# Patient Record
Sex: FEMALE | Race: OTHER | Employment: OTHER | URBAN - METROPOLITAN AREA
[De-identification: names, ages, dates, MRNs, and addresses within clinical notes are randomized per-mention and may not be internally consistent; named-entity substitution may affect disease eponyms.]

---

## 2017-11-06 NOTE — PATIENT DISCUSSION
Based on today’s exam, diagnostic studies, and/or review of records, the determination was made for treatment in 3 weeks.

## 2017-11-27 NOTE — PROCEDURE NOTE: CLINICAL
PROCEDURE NOTE: Eylea(1 of 2) #5 OS. Diagnosis: Moderate Nonproliferative Diabetic Retinopathy. Prep: Betadine Drops and Betadine Scrub. Prior to injection, risks/benefits/alternatives discussed including corneal abrasion, infection, loss of vision, hemorrhage, cataract, glaucoma, retinal tears or detachment. A written consent is on file, and the need for today’s injection was discussed and the patient is understanding and wishes to proceed. The entire vial of Eylea was drawn up into a syringe. The opened vial, remaining drug, and filtered needle were disposed of in a certified biohazard container. Betadine prep was performed. Topical anesthesia was induced with Alcaine. 4% lidocaine pledge. A lid speculum was used. A short 30g needle on a 1cc syringe was used with product that that had previously been prepared under sterile conditions. Injection site: 3-4 mm from the limbus. The used syringe/needle was transferred to a biohazard container. Lid speculum removed. Mask worn during procedure. Patient tolerated procedure well. Count fingers vision was verified. There were no complications. Patient was given the standard instruction sheet. Patient given office phone number/answering service number and advised to call immediately should there be loss of vision or pain, or should they have any other questions or concerns. Aubrey Gomez

## 2018-03-19 NOTE — PROCEDURE NOTE: CLINICAL
PROCEDURE NOTE: Eylea(1 of 2) #7 OS. Diagnosis: Moderate Nonproliferative Diabetic Retinopathy. Prep: Betadine Drops and Betadine Scrub. Prior to injection, risks/benefits/alternatives discussed including corneal abrasion, infection, loss of vision, hemorrhage, cataract, glaucoma, retinal tears or detachment. A written consent is on file, and the need for today’s injection was discussed and the patient is understanding and wishes to proceed. The entire vial of Eylea was drawn up into a syringe. The opened vial, remaining drug, and filtered needle were disposed of in a certified biohazard container. Betadine prep was performed. Topical anesthesia was induced with Alcaine. 4% lidocaine pledge. A lid speculum was used. A short 30g needle on a 1cc syringe was used with product that that had previously been prepared under sterile conditions. Injection site: 3-4 mm from the limbus. The used syringe/needle was transferred to a biohazard container. Lid speculum removed. Mask worn during procedure. Patient tolerated procedure well. Count fingers vision was verified. There were no complications. Patient was given the standard instruction sheet. Patient given office phone number/answering service number and advised to call immediately should there be loss of vision or pain, or should they have any other questions or concerns. Mariam Diaz

## 2018-03-19 NOTE — PATIENT DISCUSSION
Pt states he was unable to get authorized to have tx until 2/8/18 and had to go back to the [place] clinic to have it done.    Still some swelling present.  Rec series of 2 Eylea x 1 month apart.  Eylea #7(1 of 2) today.  Pt to follow up in [place] with retina specialist while up [facility].

## 2018-04-16 NOTE — PROCEDURE NOTE: CLINICAL
PROCEDURE NOTE: Eylea (2 of 2) #8 OS. Diagnosis: Moderate Nonproliferative Diabetic Retinopathy. Prior to injection, risks/benefits/alternatives discussed including corneal abrasion, infection, loss of vision, hemorrhage, cataract, glaucoma, retinal tears or detachment. A written consent is on file, and the need for today’s injection was discussed and the patient is understanding and wishes to proceed. The entire vial of Eylea was drawn up into a syringe. The opened vial, remaining drug, and filtered needle were disposed of in a certified biohazard container. Betadine prep was performed. Topical anesthesia was induced with Alcaine. 4% lidocaine pledge. A lid speculum was used. A short 30g needle on a 1cc syringe was used with product that that had previously been prepared under sterile conditions. Injection site: 3-4 mm from the limbus. The used syringe/needle was transferred to a biohazard container. Lid speculum removed. Mask worn during procedure. Patient tolerated procedure well. Count fingers vision was verified. There were no complications. Patient was given the standard instruction sheet. Patient given office phone number/answering service number and advised to call immediately should there be loss of vision or pain, or should they have any other questions or concerns. Juan Zarco

## 2018-04-16 NOTE — PATIENT DISCUSSION
Bridgett #8(2 of 2) today.  Pt to follow up in [place] with retina specialist while up [facility].

## 2018-10-22 NOTE — PROCEDURE NOTE: CLINICAL
PROCEDURE NOTE: Eylea 2mg #12 OS. Diagnosis: Moderate Nonproliferative Diabetic Retinopathy. Prior to injection, risks/benefits/alternatives discussed including corneal abrasion, infection, loss of vision, hemorrhage, cataract, glaucoma, retinal tears or detachment. A written consent is on file, and the need for today’s injection was discussed and the patient is understanding and wishes to proceed. The entire vial of Eylea was drawn up into a syringe. The opened vial, remaining drug, and filtered needle were disposed of in a certified biohazard container. Betadine prep was performed. Topical anesthesia was induced with Alcaine. 4% lidocaine pledge. A lid speculum was used. A short 30g needle on a 1cc syringe was used with product that that had previously been prepared under sterile conditions. Injection site: 3-4 mm from the limbus. The used syringe/needle was transferred to a biohazard container. Lid speculum removed. Mask worn during procedure. Patient tolerated procedure well. Count fingers vision was verified. There were no complications. Patient was given the standard instruction sheet. Patient given office phone number/answering service number and advised to call immediately should there be loss of vision or pain, or should they have any other questions or concerns. Cristel Jones

## 2018-10-22 NOTE — PATIENT DISCUSSION
Edema seen on OCT or exam today.  Recommended Eylea #12 injection today.  The injection was administered without complication.  Post-injection instructions were reviewed and understood by pt.

## 2018-10-22 NOTE — PATIENT DISCUSSION
----- Message from Kalina Fernandez sent at 2/27/2017  8:32 AM CST -----  Contact: Pt Spouse/Sindhu  Caller request call from nurse regardng pt having side effects to Tramadol and wants to discuss, please contact caller at [phone]   see #1.

## 2018-12-03 NOTE — PROCEDURE NOTE: CLINICAL
PROCEDURE NOTE: Eylea 2mg #13 OS. Diagnosis: Moderate Nonproliferative Diabetic Retinopathy. Prior to injection, risks/benefits/alternatives discussed including corneal abrasion, infection, loss of vision, hemorrhage, cataract, glaucoma, retinal tears or detachment. A written consent is on file, and the need for today’s injection was discussed and the patient is understanding and wishes to proceed. The entire vial of Eylea was drawn up into a syringe. The opened vial, remaining drug, and filtered needle were disposed of in a certified biohazard container. Betadine prep was performed. Topical anesthesia was induced with Alcaine. 4% lidocaine pledge. A lid speculum was used. A short 30g needle on a 1cc syringe was used with product that that had previously been prepared under sterile conditions. Injection site: 3-4 mm from the limbus. The used syringe/needle was transferred to a biohazard container. Lid speculum removed. Mask worn during procedure. Patient tolerated procedure well. Count fingers vision was verified. There were no complications. Patient was given the standard instruction sheet. Patient given office phone number/answering service number and advised to call immediately should there be loss of vision or pain, or should they have any other questions or concerns. Robert Upton

## 2018-12-03 NOTE — PATIENT DISCUSSION
Edema seen on OCT or exam today.  Recommended Eylea #13 injection today.  The injection was administered without complication.  Post-injection instructions were reviewed and understood by pt.

## 2019-01-21 NOTE — PATIENT DISCUSSION
Edema seen on OCT or exam today.  Recommended Eylea #14 injection today.  The injection was administered without complication.  Post-injection instructions were reviewed and understood by pt.

## 2019-01-21 NOTE — PROCEDURE NOTE: CLINICAL
PROCEDURE NOTE: Eylea 2mg #14 OS. Diagnosis: Moderate Nonproliferative Diabetic Retinopathy. Prior to injection, risks/benefits/alternatives discussed including corneal abrasion, infection, loss of vision, hemorrhage, cataract, glaucoma, retinal tears or detachment. A written consent is on file, and the need for today’s injection was discussed and the patient is understanding and wishes to proceed. The entire vial of Eylea was drawn up into a syringe. The opened vial, remaining drug, and filtered needle were disposed of in a certified biohazard container. Betadine prep was performed. Topical anesthesia was induced with Alcaine. 4% lidocaine pledge. A lid speculum was used. A short 30g needle on a 1cc syringe was used with product that that had previously been prepared under sterile conditions. Injection site: 3-4 mm from the limbus. The used syringe/needle was transferred to a biohazard container. Lid speculum removed. Mask worn during procedure. Patient tolerated procedure well. Count fingers vision was verified. There were no complications. Patient was given the standard instruction sheet. Patient given office phone number/answering service number and advised to call immediately should there be loss of vision or pain, or should they have any other questions or concerns. Lot # [id number], exp 7/31/19.

## 2019-03-18 NOTE — PROCEDURE NOTE: CLINICAL
PROCEDURE NOTE: Eylea 2mg (1 of 2) #15 OS. Diagnosis: Moderate Nonproliferative Diabetic Retinopathy. Prior to injection, risks/benefits/alternatives discussed including corneal abrasion, infection, loss of vision, hemorrhage, cataract, glaucoma, retinal tears or detachment. A written consent is on file, and the need for today’s injection was discussed and the patient is understanding and wishes to proceed. The entire vial of Eylea was drawn up into a syringe. The opened vial, remaining drug, and filtered needle were disposed of in a certified biohazard container. Betadine prep was performed. Topical anesthesia was induced with Alcaine. 4% lidocaine pledge. A lid speculum was used. A short 30g needle on a 1cc syringe was used with product that that had previously been prepared under sterile conditions. Injection site: 3-4 mm from the limbus. The used syringe/needle was transferred to a biohazard container. Lid speculum removed. Mask worn during procedure. Patient tolerated procedure well. Count fingers vision was verified. There were no complications. Patient was given the standard instruction sheet. Patient given office phone number/answering service number and advised to call immediately should there be loss of vision or pain, or should they have any other questions or concerns. Dayne Hoskins

## 2019-03-18 NOTE — PATIENT DISCUSSION
Pt edu edema seen on OCT and exam today.  Recommended Eylea #15 (1 of 2) injection today.  The injection was administered without complication.  Post-injection instructions were reviewed and understood by pt. Will make this a series since patient is leaving to go back [facility] [address].

## 2019-04-29 NOTE — PATIENT DISCUSSION
Eylea #16 (2 of 2) injection today.  The injection was administered without complication.  Post-injection instructions were reviewed and understood by pt. Recommend pt be seen up [facility] in 6 weeks.

## 2019-04-29 NOTE — PROCEDURE NOTE: CLINICAL
PROCEDURE NOTE: Eylea 2mg ( 2 of 2) #16 OS. Diagnosis: Diabetic Macular Edema. Prior to injection, risks/benefits/alternatives discussed including corneal abrasion, infection, loss of vision, hemorrhage, cataract, glaucoma, retinal tears or detachment. A written consent is on file, and the need for today’s injection was discussed and the patient is understanding and wishes to proceed. The entire vial of Eylea was drawn up into a syringe. The opened vial, remaining drug, and filtered needle were disposed of in a certified biohazard container. Betadine prep was performed. Topical anesthesia was induced with Alcaine. 4% lidocaine pledge. A lid speculum was used. A short 30g needle on a 1cc syringe was used with product that that had previously been prepared under sterile conditions. Injection site: 3-4 mm from the limbus. The used syringe/needle was transferred to a biohazard container. Lid speculum removed. Mask worn during procedure. Patient tolerated procedure well. Count fingers vision was verified. There were no complications. Patient was given the standard instruction sheet. Patient given office phone number/answering service number and advised to call immediately should there be loss of vision or pain, or should they have any other questions or concerns. Katlyn Walsh

## 2019-10-14 NOTE — PATIENT DISCUSSION
Good response to tx, high tendency for activity to return.  Rec series of 2 Eylea x 5-6 weeks apart.  ***PT was treated up [facility] less than 28 days ago, too soon for tx today.  Pt to return for tx as scheduled below. ***.

## 2019-10-16 NOTE — PROCEDURE NOTE: CLINICAL
PROCEDURE NOTE: Eylea 2mg (1 of 2) #18 OS. Diagnosis: Moderate Nonproliferative Diabetic Retinopathy. Prep: Betadine Drops and Betadine Scrub. Prior to injection, risks/benefits/alternatives discussed including corneal abrasion, infection, loss of vision, hemorrhage, cataract, glaucoma, retinal tears or detachment. A written consent is on file, and the need for today’s injection was discussed and the patient is understanding and wishes to proceed. The entire vial of Eylea was drawn up into a syringe. The opened vial, remaining drug, and filtered needle were disposed of in a certified biohazard container. Betadine prep was performed. Topical anesthesia was induced with Alcaine. 4% lidocaine pledge. A lid speculum was used. A short 30g needle on a 1cc syringe was used with product that that had previously been prepared under sterile conditions. Injection site: 3-4 mm from the limbus. The used syringe/needle was transferred to a biohazard container. Lid speculum removed. Mask worn during procedure. Patient tolerated procedure well. Count fingers vision was verified. There were no complications. Patient was given the standard instruction sheet. Patient given office phone number/answering service number and advised to call immediately should there be loss of vision or pain, or should they have any other questions or concerns. Mariam Diaz

## 2019-12-30 NOTE — PROCEDURE NOTE: CLINICAL
PROCEDURE NOTE: Eylea 2mg (2 of 2) #19 OS. Diagnosis: Diabetic Macular Edema. Prep: Betadine Drops and Betadine Scrub. Prior to injection, risks/benefits/alternatives discussed including corneal abrasion, infection, loss of vision, hemorrhage, cataract, glaucoma, retinal tears or detachment. A written consent is on file, and the need for today’s injection was discussed and the patient is understanding and wishes to proceed. The entire vial of Eylea was drawn up into a syringe. The opened vial, remaining drug, and filtered needle were disposed of in a certified biohazard container. Betadine prep was performed. Topical anesthesia was induced with Alcaine. 4% lidocaine pledge. A lid speculum was used. A short 30g needle on a 1cc syringe was used with product that that had previously been prepared under sterile conditions. Injection site: 3-4 mm from the limbus. The used syringe/needle was transferred to a biohazard container. Lid speculum removed. Mask worn during procedure. Patient tolerated procedure well. Count fingers vision was verified. There were no complications. Patient was given the standard instruction sheet. Patient given office phone number/answering service number and advised to call immediately should there be loss of vision or pain, or should they have any other questions or concerns. Justine Nichols

## 2019-12-30 NOTE — PATIENT DISCUSSION
Pt here for Eylea #19(2 of 2) today. OCT performed today due to decreased vision. Worse DME. Will follow up in 4-5 weeks.

## 2020-03-02 NOTE — PATIENT DISCUSSION
Bridgett #20 (1 of 2) Given without complication. Post injection instructions given and understood by patient.

## 2020-03-30 NOTE — PATIENT DISCUSSION
Bridgett #21 (2 of 2) Given without complication. Post injection instructions given and understood by patient.

## 2020-03-30 NOTE — PROCEDURE NOTE: CLINICAL
PROCEDURE NOTE: Eylea Prefilled Syringe 2mg (2 of 2) #21 OS. Diagnosis: Diabetes, Type II with Ocular Complications. Prior to injection, risks/benefits/alternatives discussed including corneal abrasion, infection, loss of vision, hemorrhage, cataract, glaucoma, retinal tears or detachment. A written consent is on file, and the need for today's injection was discussed and the patient is understanding and wishes to proceed. A 30G needle was placed on an Eylea 2mg/0.05ml Pre-filled Syringe. Betadine prep was performed. Topical anesthesia was induced with Alcaine. 4% lidocaine pledge. A lid speculum was used. An intravitreal injection of Eylea was given. Injection site: 3-4 mm from the limbus. The used syringe/needle was transferred to a biohazard container. Lid speculum removed. Mask worn during procedure. Patient tolerated procedure well. Count fingers vision was verified. There were no complications. Patient was given the standard instruction sheet. Cristel Jones

## 2020-10-12 NOTE — PATIENT DISCUSSION
Edema is resolved but has been persistent. Based on today’s exam, diagnostic studies, and/or review of records, the determination was made for treatment.  Too soon for another Eylea(pt just had Eylea 9/30/2020 in [place]), due to pt's conflicting travel schedule RTC 10/26/2020 and will use SAMPLE Eylea since will still be sooner than 28 days.  Series of 2 Eylea to be scheduled x 4 weeks apart.

## 2020-10-26 NOTE — PROCEDURE NOTE: CLINICAL
PROCEDURE NOTE: Eylea Sample (1 of 2) #26 OS. Diagnosis: Moderate Nonproliferative Diabetic Retinopathy. Anesthesia: Topical. Prep: Betadine Drops and Betadine Scrub. Prior to injection, risks/benefits/alternatives discussed including corneal abrasion, infection, loss of vision, hemorrhage, cataract, glaucoma, retinal tears or detachment. A written consent is on file, and the need for today’s injection was discussed and the patient is understanding and wishes to proceed. The entire vial of Eylea was drawn up into a syringe. The opened vial, remaining drug, and filtered needle were disposed of in a certified biohazard container. Betadine prep was performed. Topical anesthesia was induced with Alcaine. 4% lidocaine pledge. A lid speculum was used. A short 30g needle on a 1cc syringe was used with product that that had previously been prepared under sterile conditions. Injection site: 3-4 mm from the limbus. The used syringe/needle was transferred to a biohazard container. Lid speculum removed. Mask worn during procedure. Patient tolerated procedure well. Count fingers vision was verified. There were no complications. Patient was given the standard instruction sheet. Patient given office phone number/answering service number and advised to call immediately should there be loss of vision or pain, or should they have any other questions or concerns. Mariam Diaz

## 2020-10-26 NOTE — PATIENT DISCUSSION
Pt here for Bridgett Sample #26 (1 of 2). Injection given and tolerated well. *Samples used today due to <28 days from last injection. * Post injection instructions given and understood by patient. Will follow up in 4 weeks for Bridgett (2 of 2).

## 2020-11-30 NOTE — PROCEDURE NOTE: CLINICAL
PROCEDURE NOTE: Eylea 2mg (2 of 2) #27 OS. Diagnosis: Moderate Nonproliferative Diabetic Retinopathy. Prep: Betadine Drops and Betadine Scrub. Prior to injection, risks/benefits/alternatives discussed including corneal abrasion, infection, loss of vision, hemorrhage, cataract, glaucoma, retinal tears or detachment. A written consent is on file, and the need for today’s injection was discussed and the patient is understanding and wishes to proceed. The entire vial of Eylea was drawn up into a syringe. The opened vial, remaining drug, and filtered needle were disposed of in a certified biohazard container. Betadine prep was performed. Topical anesthesia was induced with Alcaine. 4% lidocaine pledge. A lid speculum was used. A short 30g needle on a 1cc syringe was used with product that that had previously been prepared under sterile conditions. Injection site: 3-4 mm from the limbus. The used syringe/needle was transferred to a biohazard container. Lid speculum removed. Mask worn during procedure. Patient tolerated procedure well. Count fingers vision was verified. There were no complications. Patient was given the standard instruction sheet. Patient given office phone number/answering service number and advised to call immediately should there be loss of vision or pain, or should they have any other questions or concerns. Gypsy Meng

## 2021-02-15 NOTE — PROCEDURE NOTE: CLINICAL
PROCEDURE NOTE: Eylea 2mg (1 of 2) #28 OS. Diagnosis: Moderate Nonproliferative Diabetic Retinopathy. Prep: Betadine Drops and Betadine Scrub. Prior to injection, risks/benefits/alternatives discussed including corneal abrasion, infection, loss of vision, hemorrhage, cataract, glaucoma, retinal tears or detachment. A written consent is on file, and the need for today’s injection was discussed and the patient is understanding and wishes to proceed. The entire vial of Eylea was drawn up into a syringe. The opened vial, remaining drug, and filtered needle were disposed of in a certified biohazard container. Betadine prep was performed. Topical anesthesia was induced with Alcaine. 4% lidocaine pledge. A lid speculum was used. A short 30g needle on a 1cc syringe was used with product that that had previously been prepared under sterile conditions. Injection site: 3-4 mm from the limbus. The used syringe/needle was transferred to a biohazard container. Lid speculum removed. Mask worn during procedure. Patient tolerated procedure well. Count fingers vision was verified. There were no complications. Patient was given the standard instruction sheet. Patient given office phone number/answering service number and advised to call immediately should there be loss of vision or pain, or should they have any other questions or concerns. Lexie Ackerman

## 2021-02-15 NOTE — PATIENT DISCUSSION
Bridgett #28 (1 of 2) given with out complication, post injection instructions given and understood by patient.

## 2021-03-29 NOTE — PATIENT DISCUSSION
Bridgett #29 (2 of 2) given with out complication, post injection instructions given and understood by patient.

## 2021-03-29 NOTE — PROCEDURE NOTE: CLINICAL
PROCEDURE NOTE: Eylea 2mg (2 of 2) #29 OS. Diagnosis: Moderate Nonproliferative Diabetic Retinopathy. Prep: Betadine Drops and Betadine Scrub. Prior to injection, risks/benefits/alternatives discussed including corneal abrasion, infection, loss of vision, hemorrhage, cataract, glaucoma, retinal tears or detachment. A written consent is on file, and the need for today’s injection was discussed and the patient is understanding and wishes to proceed. The entire vial of Eylea was drawn up into a syringe. The opened vial, remaining drug, and filtered needle were disposed of in a certified biohazard container. Betadine prep was performed. Topical anesthesia was induced with Alcaine. 4% lidocaine pledge. A lid speculum was used. A short 30g needle on a 1cc syringe was used with product that that had previously been prepared under sterile conditions. Injection site: 3-4 mm from the limbus. The used syringe/needle was transferred to a biohazard container. Lid speculum removed. Mask worn during procedure. Patient tolerated procedure well. Count fingers vision was verified. There were no complications. Patient was given the standard instruction sheet. Patient given office phone number/answering service number and advised to call immediately should there be loss of vision or pain, or should they have any other questions or concerns. Suresh Barrett

## 2021-11-01 NOTE — PROCEDURE NOTE: CLINICAL
PROCEDURE NOTE: Eylea 2mg(1 of 2) #32 OS. Diagnosis: Moderate Nonproliferative Diabetic Retinopathy. Prep: Betadine Drops and Betadine Scrub. Prior to injection, risks/benefits/alternatives discussed including corneal abrasion, infection, loss of vision, hemorrhage, cataract, glaucoma, retinal tears or detachment. A written consent is on file, and the need for today’s injection was discussed and the patient is understanding and wishes to proceed. The entire vial of Eylea was drawn up into a syringe. The opened vial, remaining drug, and filtered needle were disposed of in a certified biohazard container. Betadine prep was performed. Topical anesthesia was induced with Alcaine. 4% lidocaine pledge. A lid speculum was used. A short 30g needle on a 1cc syringe was used with product that that had previously been prepared under sterile conditions. Injection site: 3-4 mm from the limbus. The used syringe/needle was transferred to a biohazard container. Lid speculum removed. Mask worn during procedure. Patient tolerated procedure well. Count fingers vision was verified. There were no complications. Patient was given the standard instruction sheet. Patient given office phone number/answering service number and advised to call immediately should there be loss of vision or pain, or should they have any other questions or concerns. Florentin Fernández

## 2021-11-01 NOTE — PATIENT DISCUSSION
Good response to tx, rec Eylea #32(2 of 2) given with out complication, post injection instructions given and understood by patient.  series of 2 x 5-6 weeks apart.

## 2021-12-06 NOTE — PROCEDURE NOTE: CLINICAL
PROCEDURE NOTE: Eylea 2mg (2 of 2) #33 OS. Diagnosis: Moderate Nonproliferative Diabetic Retinopathy. Prep: Betadine Drops and Betadine Scrub. Prior to injection, risks/benefits/alternatives discussed including corneal abrasion, infection, loss of vision, hemorrhage, cataract, glaucoma, retinal tears or detachment. A written consent is on file, and the need for today’s injection was discussed and the patient is understanding and wishes to proceed. The entire vial of Eylea was drawn up into a syringe. The opened vial, remaining drug, and filtered needle were disposed of in a certified biohazard container. Betadine prep was performed. Topical anesthesia was induced with Alcaine. 4% lidocaine pledge. A lid speculum was used. A short 30g needle on a 1cc syringe was used with product that that had previously been prepared under sterile conditions. Injection site: 3-4 mm from the limbus. The used syringe/needle was transferred to a biohazard container. Lid speculum removed. Mask worn during procedure. Patient tolerated procedure well. Count fingers vision was verified. There were no complications. Patient was given the standard instruction sheet. Patient given office phone number/answering service number and advised to call immediately should there be loss of vision or pain, or should they have any other questions or concerns. Wayne Espinosa

## 2021-12-06 NOTE — PATIENT DISCUSSION
Patient here for Eylea #33 (2 of 2) injection today. The injection was given with out complication, post injection instructions given and understood by patient. Today ends series of 2.  Return  5-6 weeks for OCT.

## 2022-01-17 NOTE — PATIENT DISCUSSION
Rec Eylea #34 (1 of 2) injection today. The injection was given with out complication, post injection instructions given and understood by patient.  Series of 2 x 5-6 weeks apart.

## 2022-01-17 NOTE — PROCEDURE NOTE: CLINICAL
PROCEDURE NOTE: Eylea 2mg(1 of 2) #34 OS. Diagnosis: Moderate Nonproliferative Diabetic Retinopathy. Prep: Betadine Drops and Betadine Scrub. Prior to injection, risks/benefits/alternatives discussed including corneal abrasion, infection, loss of vision, hemorrhage, cataract, glaucoma, retinal tears or detachment. A written consent is on file, and the need for today’s injection was discussed and the patient is understanding and wishes to proceed. The entire vial of Eylea was drawn up into a syringe. The opened vial, remaining drug, and filtered needle were disposed of in a certified biohazard container. Betadine prep was performed. Topical anesthesia was induced with Alcaine. 4% lidocaine pledge. A lid speculum was used. A short 30g needle on a 1cc syringe was used with product that that had previously been prepared under sterile conditions. Injection site: 3-4 mm from the limbus. The used syringe/needle was transferred to a biohazard container. Lid speculum removed. Mask worn during procedure. Patient tolerated procedure well. Count fingers vision was verified. There were no complications. Patient was given the standard instruction sheet. Patient given office phone number/answering service number and advised to call immediately should there be loss of vision or pain, or should they have any other questions or concerns. [employer]

## 2022-02-11 ENCOUNTER — NEW PATIENT (OUTPATIENT)
Dept: URBAN - METROPOLITAN AREA CLINIC 46 | Facility: CLINIC | Age: 52
End: 2022-02-11

## 2022-02-11 DIAGNOSIS — Z90.01: ICD-10-CM

## 2022-02-11 DIAGNOSIS — G45.3: ICD-10-CM

## 2022-02-11 DIAGNOSIS — E10.3591: ICD-10-CM

## 2022-02-11 DIAGNOSIS — H57.89: ICD-10-CM

## 2022-02-11 DIAGNOSIS — H53.8: ICD-10-CM

## 2022-02-11 PROCEDURE — 99203 OFFICE O/P NEW LOW 30 MIN: CPT

## 2022-02-11 ASSESSMENT — TONOMETRY: OD_IOP_MMHG: 18

## 2022-02-11 ASSESSMENT — VISUAL ACUITY
OD_CC: J1
OD_CC: 20/30

## 2022-02-11 NOTE — PATIENT DISCUSSION
rec carotid doppler and Echo-cardiogram as soon as possible. Disc with pt that if occurs again must go to [facility] Stroke ER for urgent treatment. Pt refuses to go today but would rather have Vickey Oh MD PCP evaluate.

## 2022-02-11 NOTE — PATIENT DISCUSSION
pt describe loss of vision for short period of time, red black out. More than likely Amaurosis and must rule out cardiovascular issues.

## 2022-02-28 NOTE — PROCEDURE NOTE: CLINICAL
PROCEDURE NOTE: Eylea 2mg(2 of 2) #35 OS. Diagnosis: Moderate Nonproliferative Diabetic Retinopathy. Prep: Betadine Drops and Betadine Scrub. Prior to injection, risks/benefits/alternatives discussed including corneal abrasion, infection, loss of vision, hemorrhage, cataract, glaucoma, retinal tears or detachment. A written consent is on file, and the need for today’s injection was discussed and the patient is understanding and wishes to proceed. The entire vial of Eylea was drawn up into a syringe. The opened vial, remaining drug, and filtered needle were disposed of in a certified biohazard container. Betadine prep was performed. Topical anesthesia was induced with Alcaine. 4% lidocaine pledge. A lid speculum was used. A short 30g needle on a 1cc syringe was used with product that that had previously been prepared under sterile conditions. Injection site: 3-4 mm from the limbus. The used syringe/needle was transferred to a biohazard container. Lid speculum removed. Mask worn during procedure. Patient tolerated procedure well. Count fingers vision was verified. There were no complications. Patient was given the standard instruction sheet. Patient given office phone number/answering service number and advised to call immediately should there be loss of vision or pain, or should they have any other questions or concerns. Florentin Fernández

## 2022-02-28 NOTE — PATIENT DISCUSSION
Patient here for Eylea #35 (2 of 2) injection today. The injection was given with out complication, post injection instructions given and understood by patient.  Ends a series of 2 x 5-6 weeks apart. Return for OCT.

## 2022-04-11 NOTE — PATIENT DISCUSSION
An examination that was significantly and separately identifiable from the procedure performed today was also completed for multiple retinal dx OU.

## 2022-04-11 NOTE — PATIENT DISCUSSION
Recommend Eylea #35 (2 of 2) injection today. The injection was given with out complication, post injection instructions given and understood by patient.  Ends a series of 2 x 5-6 weeks apart. Return for OCT.

## 2022-04-11 NOTE — PROCEDURE NOTE: CLINICAL
PROCEDURE NOTE: Eylea Prefilled Syringe 2mg(1 of 2) #36 OS. Diagnosis: Moderate Nonproliferative Diabetic Retinopathy. Prep: Betadine Drops and Betadine Scrub. Prior to injection, risks/benefits/alternatives discussed including corneal abrasion, infection, loss of vision, hemorrhage, cataract, glaucoma, retinal tears or detachment. A written consent is on file, and the need for today's injection was discussed and the patient is understanding and wishes to proceed. A 30G needle was placed on an Eylea 2mg/0.05ml Pre-filled Syringe. Betadine prep was performed. Topical anesthesia was induced with Alcaine. 4% lidocaine pledge. A lid speculum was used. An intravitreal injection of Eylea was given. Injection site: 3-4 mm from the limbus. The used syringe/needle was transferred to a biohazard container. Lid speculum removed. Mask worn during procedure. Patient tolerated procedure well. Count fingers vision was verified. There were no complications. Patient was given the standard instruction sheet. Elena Augustin

## 2022-04-11 NOTE — PATIENT DISCUSSION
Patient states that he fell backwards and hit the back of his head on a concrete wall. patient was not seen by anyone. No papilledema seen on exam. Recommend that he continue to put some triple antibiotic ointment on it. Also recommend that he have a CT scan done to ensure no unseen damage was done.

## 2022-05-11 ENCOUNTER — EMERGENCY VISIT (OUTPATIENT)
Dept: URBAN - METROPOLITAN AREA CLINIC 46 | Facility: CLINIC | Age: 52
End: 2022-05-11

## 2022-05-11 DIAGNOSIS — H35.371: ICD-10-CM

## 2022-05-11 DIAGNOSIS — H53.8: ICD-10-CM

## 2022-05-11 DIAGNOSIS — H35.09: ICD-10-CM

## 2022-05-11 DIAGNOSIS — H43.11: ICD-10-CM

## 2022-05-11 DIAGNOSIS — H35.031: ICD-10-CM

## 2022-05-11 DIAGNOSIS — E10.3591: ICD-10-CM

## 2022-05-11 PROCEDURE — 92134 CPTRZ OPH DX IMG PST SGM RTA: CPT

## 2022-05-11 PROCEDURE — 99214 OFFICE O/P EST MOD 30 MIN: CPT

## 2022-05-11 ASSESSMENT — TONOMETRY: OD_IOP_MMHG: 14

## 2022-05-11 ASSESSMENT — VISUAL ACUITY: OD_CC: 20/50

## 2022-05-11 NOTE — PATIENT DISCUSSION
rec carotid doppler and Echo-cardiogram as soon as possible. Disc with pt that if occurs again must go to [address] [place] [address] Stroke ER for urgent treatment. Pt refuses to go today but would rather have Dillon Rosen MD PCP evaluate.

## 2022-05-17 ENCOUNTER — CONSULTATION/EVALUATION (OUTPATIENT)
Dept: URBAN - METROPOLITAN AREA CLINIC 46 | Facility: CLINIC | Age: 52
End: 2022-05-17

## 2022-05-17 DIAGNOSIS — E10.3511: ICD-10-CM

## 2022-05-17 DIAGNOSIS — H35.371: ICD-10-CM

## 2022-05-17 DIAGNOSIS — H43.11: ICD-10-CM

## 2022-05-17 DIAGNOSIS — H35.09: ICD-10-CM

## 2022-05-17 DIAGNOSIS — H35.031: ICD-10-CM

## 2022-05-17 DIAGNOSIS — G45.3: ICD-10-CM

## 2022-05-17 PROCEDURE — 92287 ANT SGM IMG IR FLRSCN ANGRPH: CPT

## 2022-05-17 PROCEDURE — 67028 INJECTION EYE DRUG: CPT

## 2022-05-17 PROCEDURE — 99214 OFFICE O/P EST MOD 30 MIN: CPT

## 2022-05-17 PROCEDURE — 76512 OPH US DX B-SCAN: CPT

## 2022-05-17 PROCEDURE — 92235 FLUORESCEIN ANGRPH MLTIFRAME: CPT

## 2022-05-17 PROCEDURE — 92250 FUNDUS PHOTOGRAPHY W/I&R: CPT

## 2022-05-17 PROCEDURE — 92273 FULL FIELD ERG W/I&R: CPT

## 2022-05-17 PROCEDURE — J2778DME LUCENTIS 0.3MG SAMPLE

## 2022-05-17 ASSESSMENT — VISUAL ACUITY: OD_CC: 20/30-1

## 2022-05-17 ASSESSMENT — TONOMETRY: OD_IOP_MMHG: 17

## 2022-05-17 NOTE — PATIENT DISCUSSION
An examination that was significantly and separately identifiable from the procedure performed today was also completed for this Vit Heme.

## 2022-05-17 NOTE — PROCEDURE NOTE: CLINICAL
PROCEDURE NOTE: Lucentis 0.3mg Sample OD. Diagnosis: Diabetic Macular Edema. Anesthesia: Topical. Prep: Betadine Drops and Betadine Scrub. The patient was identified visually and verbally by the surgeon. The procedure eye was marked with an adhesive arrow sticker. The risks, benefits, alternatives and possible complications of the procedure were discussed with the patient and informed consent was obtained. The patient was positioned in the chair. After numerous topical anesthetic drops were placed, subconjunctival lidocaine was administered. A speculum was used to hold the eyelid open. A caliper was used to marked the site of injection 3.5-4mm from the limbus. Betadine was placed on the site with a swab and allowed to sit for thirty seconds. A sterile 30 or 31 guage needle with the medication entered the vitreous cavity and the medication was administered. The speculum was removed. The eye was copiously rinsed with saline to remove all betadine, especially from the fornices. Gross vision was assessed. If the patient wished an antibiotic ointment and eye patch were applied. The patient was instructed to call immediately if any significant visual loss, swelling, discharge, or pain occurred. Intravitreal injection of Lucentis 0.3mg/0.05 ml was given. Patient tolerated the procedure well. There were no complications. CF vision checked. Post procedure instructions given. Patricia Miranda

## 2022-05-17 NOTE — PATIENT DISCUSSION
Recommended Lucentis . 3 Sample  injection today. The injection was given and tolerated well by patient. Post-injection instructions were reviewed and understood by the patient.

## 2022-05-17 NOTE — PATIENT DISCUSSION
Active, Discussed the importance of blood sugar control in the prevention of ocular complications. Start IVL.

## 2022-05-27 ENCOUNTER — EMERGENCY VISIT (OUTPATIENT)
Dept: URBAN - METROPOLITAN AREA CLINIC 43 | Facility: CLINIC | Age: 52
End: 2022-05-27

## 2022-05-27 DIAGNOSIS — H43.11: ICD-10-CM

## 2022-05-27 DIAGNOSIS — E10.3511: ICD-10-CM

## 2022-05-27 DIAGNOSIS — H35.371: ICD-10-CM

## 2022-05-27 PROCEDURE — 92134 CPTRZ OPH DX IMG PST SGM RTA: CPT

## 2022-05-27 PROCEDURE — 92012 INTRM OPH EXAM EST PATIENT: CPT

## 2022-05-27 ASSESSMENT — TONOMETRY: OD_IOP_MMHG: 21

## 2022-05-27 ASSESSMENT — VISUAL ACUITY: OD_CC: 20/30-2

## 2022-06-21 ENCOUNTER — ESTABLISHED PATIENT (OUTPATIENT)
Dept: URBAN - METROPOLITAN AREA CLINIC 46 | Facility: CLINIC | Age: 52
End: 2022-06-21

## 2022-06-21 DIAGNOSIS — H35.371: ICD-10-CM

## 2022-06-21 DIAGNOSIS — E10.3511: ICD-10-CM

## 2022-06-21 DIAGNOSIS — H53.9: ICD-10-CM

## 2022-06-21 DIAGNOSIS — H35.031: ICD-10-CM

## 2022-06-21 DIAGNOSIS — H35.09: ICD-10-CM

## 2022-06-21 DIAGNOSIS — H35.30: ICD-10-CM

## 2022-06-21 DIAGNOSIS — H43.11: ICD-10-CM

## 2022-06-21 PROCEDURE — 67028 INJECTION EYE DRUG: CPT

## 2022-06-21 PROCEDURE — J2778DME LUCENTIS 0.3MG SAMPLE

## 2022-06-21 PROCEDURE — 92250 FUNDUS PHOTOGRAPHY W/I&R: CPT

## 2022-06-21 PROCEDURE — 99214 OFFICE O/P EST MOD 30 MIN: CPT

## 2022-06-21 ASSESSMENT — VISUAL ACUITY: OD_CC: 20/25-3

## 2022-06-21 ASSESSMENT — TONOMETRY: OD_IOP_MMHG: 18

## 2022-06-21 NOTE — PATIENT DISCUSSION
Pt C/O Floaters in the right eye, Scleral Depressed 360, No RT or RD seen on todays exam, Likely due to Vit Heme.

## 2022-06-21 NOTE — PATIENT DISCUSSION
An examination that was significantly and separately identifiable from the procedure performed today was also completed for this Visual Disturbance.

## 2022-06-21 NOTE — PATIENT DISCUSSION
Recommended Lucentis . 3  injection today. The injection was given and tolerated well by patient. Post-injection instructions were reviewed and understood by the patient.

## 2022-06-21 NOTE — PROCEDURE NOTE: CLINICAL
PROCEDURE NOTE: Lucentis 0.3mg Sample OD. Diagnosis: Diabetic Macular Edema. Anesthesia: Topical. Prep: Betadine Drops and Betadine Scrub. The patient was identified visually and verbally by the surgeon. The procedure eye was marked with an adhesive arrow sticker. The risks, benefits, alternatives and possible complications of the procedure were discussed with the patient and informed consent was obtained. The patient was positioned in the chair. After numerous topical anesthetic drops were placed, subconjunctival lidocaine was administered. A speculum was used to hold the eyelid open. A caliper was used to marked the site of injection 3.5-4mm from the limbus. Betadine was placed on the site with a swab and allowed to sit for thirty seconds. A sterile 30 or 31 guage needle with the medication entered the vitreous cavity and the medication was administered. The speculum was removed. The eye was copiously rinsed with saline to remove all betadine, especially from the fornices. Gross vision was assessed. If the patient wished an antibiotic ointment and eye patch were applied. The patient was instructed to call immediately if any significant visual loss, swelling, discharge, or pain occurred. Intravitreal injection of Lucentis 0.3mg/0.05 ml was given. Patient tolerated the procedure well. There were no complications. CF vision checked. Post procedure instructions given. Daija Lenz

## 2022-08-02 ENCOUNTER — ESTABLISHED PATIENT (OUTPATIENT)
Dept: URBAN - METROPOLITAN AREA CLINIC 46 | Facility: CLINIC | Age: 52
End: 2022-08-02

## 2022-08-02 DIAGNOSIS — H53.9: ICD-10-CM

## 2022-08-02 DIAGNOSIS — H35.371: ICD-10-CM

## 2022-08-02 DIAGNOSIS — E10.3511: ICD-10-CM

## 2022-08-02 DIAGNOSIS — H35.09: ICD-10-CM

## 2022-08-02 DIAGNOSIS — H35.031: ICD-10-CM

## 2022-08-02 DIAGNOSIS — H43.11: ICD-10-CM

## 2022-08-02 PROCEDURE — 92287 ANT SGM IMG IR FLRSCN ANGRPH: CPT

## 2022-08-02 PROCEDURE — 92250 FUNDUS PHOTOGRAPHY W/I&R: CPT

## 2022-08-02 PROCEDURE — 92273 FULL FIELD ERG W/I&R: CPT

## 2022-08-02 PROCEDURE — 92235 FLUORESCEIN ANGRPH MLTIFRAME: CPT

## 2022-08-02 PROCEDURE — J2778DME LUCENTIS 0.3MG SAMPLE

## 2022-08-02 PROCEDURE — 99213 OFFICE O/P EST LOW 20 MIN: CPT

## 2022-08-02 PROCEDURE — 67028 INJECTION EYE DRUG: CPT

## 2022-08-02 ASSESSMENT — VISUAL ACUITY: OD_CC: 20/25

## 2022-08-02 NOTE — PROCEDURE NOTE: CLINICAL
PROCEDURE NOTE: Lucentis 0.3mg Sample OD. Diagnosis: Diabetic Macular Edema. Anesthesia: Topical. Prep: Betadine Drops and Betadine Scrub. The patient was identified visually and verbally by the surgeon. The procedure eye was marked with an adhesive arrow sticker. The risks, benefits, alternatives and possible complications of the procedure were discussed with the patient and informed consent was obtained. The patient was positioned in the chair. After numerous topical anesthetic drops were placed, subconjunctival lidocaine was administered. A speculum was used to hold the eyelid open. A caliper was used to marked the site of injection 3.5-4mm from the limbus. Betadine was placed on the site with a swab and allowed to sit for thirty seconds. A sterile 30 or 31 guage needle with the medication entered the vitreous cavity and the medication was administered. The speculum was removed. The eye was copiously rinsed with saline to remove all betadine, especially from the fornices. Gross vision was assessed. If the patient wished an antibiotic ointment and eye patch were applied. The patient was instructed to call immediately if any significant visual loss, swelling, discharge, or pain occurred. Intravitreal injection of Lucentis 0.3mg/0.05 ml was given. Patient tolerated the procedure well. There were no complications. CF vision checked. Post procedure instructions given. Mildred Wallace

## 2022-08-02 NOTE — PATIENT DISCUSSION
Discussed the importance of blood pressure control in the prevention of ocular complications. [facility]

## 2022-10-18 NOTE — PROCEDURE NOTE: CLINICAL
PROCEDURE NOTE: Eylea Prefilled Syringe 2mg OS. Diagnosis: Diabetic Macular Edema. Prep: Betadine Drops and Betadine Scrub. Prior to injection, risks/benefits/alternatives discussed including corneal abrasion, infection, loss of vision, hemorrhage, cataract, glaucoma, retinal tears or detachment. A written consent is on file, and the need for today's injection was discussed and the patient is understanding and wishes to proceed. A 30G needle was placed on an Eylea 2mg/0.05ml Pre-filled Syringe. Betadine prep was performed. Topical anesthesia was induced with Alcaine. 4% lidocaine pledge. A lid speculum was used. An intravitreal injection of Eylea was given. Injection site: 3-4 mm from the limbus. The used syringe/needle was transferred to a biohazard container. Lid speculum removed. Mask worn during procedure. Patient tolerated procedure well. Count fingers vision was verified. There were no complications. Patient was given the standard instruction sheet. Mariam Diaz

## 2022-12-07 NOTE — PROCEDURE NOTE: CLINICAL
PROCEDURE NOTE: Eylea Prefilled Syringe 2mg OS. Diagnosis: Diabetic Macular Edema. Prep: Betadine Drops and Betadine Scrub. Prior to injection, risks/benefits/alternatives discussed including corneal abrasion, infection, loss of vision, hemorrhage, cataract, glaucoma, retinal tears or detachment. A written consent is on file, and the need for today's injection was discussed and the patient is understanding and wishes to proceed. A 30G needle was placed on an Eylea 2mg/0.05ml Pre-filled Syringe. Betadine prep was performed. Topical anesthesia was induced with Alcaine. 4% lidocaine pledge. A lid speculum was used. An intravitreal injection of Eylea was given. Injection site: 3-4 mm from the limbus. The used syringe/needle was transferred to a biohazard container. Lid speculum removed. Mask worn during procedure. Patient tolerated procedure well. Count fingers vision was verified. There were no complications. Patient was given the standard instruction sheet. Nery Mclean

## 2023-10-16 NOTE — PATIENT DISCUSSION
It has been suspected that Ms. Tu Herrera may have lumbar radiculopathy accounting for her symptoms. I told her that this is not correct. Her EMG is insufficient to distinguish between L5 radiculopathy and peroneal mononeuropathy. From a purely electrical standpoint the latter appears to be more likely given the relative sparing of the short head of the biceps femoris, which is the only peroneal innervated muscle above the knee. No other L5 innervated muscles such as the tibialis posterior, FDL, or gluteus medius were studied. More importantly, her clinical picture is not in keeping with lumbar radiculopathy. She has asymmetrical spasticity of all extremities with left-sided hemiparesis that would not be explained by lumbar pathology. Cervical spine pathology is my main concern. Intracranial disease is also a consideration even though I was not able to elicit abnormalities above her neck. I am particularly concerned about her history of underlying malignancy. She needs an MRI of the brain and cervical spine to make sure that she does not have a structural abnormality such as neoplasia that might account for her symptoms. Further measures will be considered based on her clinical course. I spent 70 minutes on this visit. No retinal detachment or retinal tear noted. Universal Safety Interventions

## 2024-02-20 NOTE — PATIENT DISCUSSION
Discussed with the patient the importance of good control of their blood sugar, blood pressure, cholesterol, diet, exercise, weight, and medication usage under the guidance of their diabetic doctor to prevent/halt diabetic retinopathy. Per leonor review,   [facility] [place] Phone: [phone]     Response: Yes, willing to accept patient  Comments: psychotropic med will have to be routing cannot do PRN.            02/20/24 1153   Post-Acute Status   Post-Acute Authorization Placement   Post-Acute Placement Status Pending post-acute provider review/more information requested   Discharge Delays None known at this time   Discharge Plan   Discharge Plan A Skilled Nursing Facility   Discharge Plan B Skilled Nursing Facility

## 2024-10-22 NOTE — PATIENT DISCUSSION
Discussed with the patient the importance of good control of their blood sugar, blood pressure, cholesterol, diet, exercise, weight, and medication usage under the guidance of their diabetic doctor to prevent/halt diabetic retinopathy. Pt is wanting a new one touch glucose meter with test strips and lancets

## 2024-10-25 NOTE — PATIENT DISCUSSION
see #1-4. Quality 130: Documentation Of Current Medications In The Medical Record: Current Medications Documented Quality 431: Preventive Care And Screening: Unhealthy Alcohol Use - Screening: Patient not identified as an unhealthy alcohol user when screened for unhealthy alcohol use using a systematic screening method Detail Level: Detailed Quality 226: Preventive Care And Screening: Tobacco Use: Screening And Cessation Intervention: Patient screened for tobacco use and is an ex/non-smoker